# Patient Record
Sex: MALE | Race: WHITE | URBAN - METROPOLITAN AREA
[De-identification: names, ages, dates, MRNs, and addresses within clinical notes are randomized per-mention and may not be internally consistent; named-entity substitution may affect disease eponyms.]

---

## 2018-07-18 NOTE — PATIENT DISCUSSION
Discussed option of gls or if desires more surgery to lift the lid. Told if notices any change in lid position to call the office for follow up appt and possible consult with Dr. Jennifer Johnson.

## 2018-07-18 NOTE — PATIENT DISCUSSION
CONGENITAL EYELID PTOSIS, OU: S/P LID SURGERIES AS A CHILD AND YOUNG ADULT. NO DIPLOPIA. SOME ASTIGMATISM. OFFERED RX TODAY FOR GLS. PT WISHES TO DEFER AT THIS TIME.

## 2022-04-01 ENCOUNTER — NEW PATIENT (OUTPATIENT)
Dept: URBAN - METROPOLITAN AREA CLINIC 20 | Facility: CLINIC | Age: 27
End: 2022-04-01

## 2022-04-01 DIAGNOSIS — H52.13: ICD-10-CM

## 2022-04-01 PROCEDURE — 92015 DETERMINE REFRACTIVE STATE: CPT

## 2022-04-01 PROCEDURE — 92004 COMPRE OPH EXAM NEW PT 1/>: CPT

## 2022-04-01 ASSESSMENT — VISUAL ACUITY
OD_CC: 20/20
OS_CC: 20/20
OU_CC: 20/20

## 2022-04-01 ASSESSMENT — TONOMETRY
OD_IOP_MMHG: 15
OS_IOP_MMHG: 13